# Patient Record
Sex: MALE | Race: WHITE | NOT HISPANIC OR LATINO | Employment: FULL TIME | URBAN - METROPOLITAN AREA
[De-identification: names, ages, dates, MRNs, and addresses within clinical notes are randomized per-mention and may not be internally consistent; named-entity substitution may affect disease eponyms.]

---

## 2022-09-16 DIAGNOSIS — M25.512 PAIN IN LEFT SHOULDER: Primary | ICD-10-CM

## 2022-10-24 ENCOUNTER — PROCEDURE VISIT (OUTPATIENT)
Dept: HEPATOLOGY | Facility: CLINIC | Age: 62
End: 2022-10-24
Payer: COMMERCIAL

## 2022-10-24 ENCOUNTER — OFFICE VISIT (OUTPATIENT)
Dept: HEPATOLOGY | Facility: CLINIC | Age: 62
End: 2022-10-24
Payer: COMMERCIAL

## 2022-10-24 VITALS
DIASTOLIC BLOOD PRESSURE: 74 MMHG | OXYGEN SATURATION: 94 % | TEMPERATURE: 99 F | HEART RATE: 70 BPM | BODY MASS INDEX: 33.32 KG/M2 | HEIGHT: 67 IN | RESPIRATION RATE: 18 BRPM | SYSTOLIC BLOOD PRESSURE: 139 MMHG | WEIGHT: 212.31 LBS

## 2022-10-24 DIAGNOSIS — K76.0 FATTY LIVER: Primary | ICD-10-CM

## 2022-10-24 DIAGNOSIS — E78.5 HYPERLIPIDEMIA, UNSPECIFIED HYPERLIPIDEMIA TYPE: ICD-10-CM

## 2022-10-24 DIAGNOSIS — E66.9 OBESITY (BMI 30.0-34.9): ICD-10-CM

## 2022-10-24 DIAGNOSIS — Z23 NEED FOR HEPATITIS A AND B VACCINATION: ICD-10-CM

## 2022-10-24 PROBLEM — A31.9 MYCOBACTERIUM GORDONAE INFECTION: Status: ACTIVE | Noted: 2018-08-29

## 2022-10-24 PROCEDURE — 99999 PR PBB SHADOW E&M-EST. PATIENT-LVL IV: CPT | Mod: PBBFAC,,, | Performed by: NURSE PRACTITIONER

## 2022-10-24 PROCEDURE — 3078F DIAST BP <80 MM HG: CPT | Mod: CPTII,S$GLB,, | Performed by: NURSE PRACTITIONER

## 2022-10-24 PROCEDURE — 1159F MED LIST DOCD IN RCRD: CPT | Mod: CPTII,S$GLB,, | Performed by: NURSE PRACTITIONER

## 2022-10-24 PROCEDURE — 3078F PR MOST RECENT DIASTOLIC BLOOD PRESSURE < 80 MM HG: ICD-10-PCS | Mod: CPTII,S$GLB,, | Performed by: NURSE PRACTITIONER

## 2022-10-24 PROCEDURE — 3075F PR MOST RECENT SYSTOLIC BLOOD PRESS GE 130-139MM HG: ICD-10-PCS | Mod: CPTII,S$GLB,, | Performed by: NURSE PRACTITIONER

## 2022-10-24 PROCEDURE — 1160F RVW MEDS BY RX/DR IN RCRD: CPT | Mod: CPTII,S$GLB,, | Performed by: NURSE PRACTITIONER

## 2022-10-24 PROCEDURE — 91200 LIVER ELASTOGRAPHY: CPT | Mod: S$GLB,,, | Performed by: NURSE PRACTITIONER

## 2022-10-24 PROCEDURE — 99204 PR OFFICE/OUTPT VISIT, NEW, LEVL IV, 45-59 MIN: ICD-10-PCS | Mod: S$GLB,,, | Performed by: NURSE PRACTITIONER

## 2022-10-24 PROCEDURE — 1159F PR MEDICATION LIST DOCUMENTED IN MEDICAL RECORD: ICD-10-PCS | Mod: CPTII,S$GLB,, | Performed by: NURSE PRACTITIONER

## 2022-10-24 PROCEDURE — 91200 FIBROSCAN NEW ORLEANS: ICD-10-PCS | Mod: S$GLB,,, | Performed by: NURSE PRACTITIONER

## 2022-10-24 PROCEDURE — 3075F SYST BP GE 130 - 139MM HG: CPT | Mod: CPTII,S$GLB,, | Performed by: NURSE PRACTITIONER

## 2022-10-24 PROCEDURE — 1160F PR REVIEW ALL MEDS BY PRESCRIBER/CLIN PHARMACIST DOCUMENTED: ICD-10-PCS | Mod: CPTII,S$GLB,, | Performed by: NURSE PRACTITIONER

## 2022-10-24 PROCEDURE — 99999 PR PBB SHADOW E&M-EST. PATIENT-LVL IV: ICD-10-PCS | Mod: PBBFAC,,, | Performed by: NURSE PRACTITIONER

## 2022-10-24 PROCEDURE — 99204 OFFICE O/P NEW MOD 45 MIN: CPT | Mod: S$GLB,,, | Performed by: NURSE PRACTITIONER

## 2022-10-24 RX ORDER — AMOXICILLIN 500 MG
CAPSULE ORAL DAILY
COMMUNITY

## 2022-10-24 RX ORDER — ROSUVASTATIN CALCIUM 10 MG/1
10 TABLET, COATED ORAL DAILY
COMMUNITY
Start: 2022-09-18

## 2022-10-24 RX ORDER — ESOMEPRAZOLE MAGNESIUM 40 MG/1
40 CAPSULE, DELAYED RELEASE ORAL DAILY
COMMUNITY
Start: 2022-09-18

## 2022-10-24 NOTE — PROGRESS NOTES
[facility] New Patient Visit    Reason for Visit: Fatty Liver    PCP: Tati Hutchins    HPI:  This is a 62 y.o. male with PMH noted below, here for evaluation of fatty liver.    The patient's risk factors for fatty liver disease include:     Obesity                                        Yes; BMI 33.25  Dyslipidemia                                Yes; On Rosuvastatin 10 mg daily.  Insulin resistance/Diabetes         No. Last HgbA1c was 5.3% (2019).  Family history of diabetes           No.     Abdominal ultrasound in 6/2022 showed a liver that demonstrated diffusely coarse and heterogenous parenchymal echotexture with a nodular contour; there was no ascites noted on imaging and spleen size was normal (10.7 cm). MAYO Fibrosure score was also elevated (0.50) on recent labs. LFT's have been historically normal. He has no known family history of liver disease. He has a history of heavy alcohol use (6 beers per day), but he significantly reduced his alcohol intake over the past 6-9 months.     He stopped drinking alcohol altogether in June. He does not use illicit drugs. Viral hepatitis testing was negative. He is not immune to Hepatitis A and B. Fibroscan today to stage his liver disease is suggestive of severe fatty infiltration (S3), with F0-F1 fibrosis, and a low likelihood of cirrhosis. He is well appearing, and has no signs or symptoms of hepatic decompensation including jaundice, dark urine, pruritus, abdominal distention, lower extremity edema, hematemesis, melena, or periods of confusion suggestive of hepatic encephalopathy. Further ROS below.     PMHX:  has a past medical history of Alcohol use, Fatty liver, GERD (gastroesophageal reflux disease), and HLD (hyperlipidemia).    PSHX:  has no past surgical history on file.    The patient's social and family histories were reviewed by me and updated in the appropriate section of the electronic medical record.    Review of patient's allergies  "indicates:  No Known Allergies    Current Outpatient Medications on File Prior to Visit   Medication Sig Dispense Refill    esomeprazole (NEXIUM) 40 MG capsule Take 40 mg by mouth once daily.      omega-3 fatty acids/fish oil (FISH OIL-OMEGA-3 FATTY ACIDS) 300-1,000 mg capsule Take by mouth once daily.      rosuvastatin (CRESTOR) 10 MG tablet Take 10 mg by mouth once daily.       No current facility-administered medications on file prior to visit.     SOCIAL HISTORY:   Social History     Tobacco Use   Smoking Status Not on file   Smokeless Tobacco Not on file     Social History     Substance and Sexual Activity   Alcohol Use None     Social History     Substance and Sexual Activity   Drug Use Not on file     ROS:   GENERAL: Denies fever, chills, weight loss/gain, fatigue  HEENT: Denies headaches, dizziness, vision/hearing changes  CARDIOVASCULAR: Denies chest pain, palpitations, or edema  RESPIRATORY: Denies dyspnea, cough  GI: Denies rectal bleeding, nausea, vomiting. No change in bowel pattern or color. + Intermittent RUQ pain.  : Denies dysuria, hematuria   SKIN: Denies rash, itching   NEURO: Denies confusion, memory loss, or mood changes  PSYCH: Denies depression or anxiety  HEME/LYMPH: Denies easy bruising or bleeding    Objective Findings:    PHYSICAL EXAM:   Friendly White male, in no acute distress; alert and oriented to person, place and time.  VITALS: /74 (BP Location: Right arm, Patient Position: Sitting, BP Method: Medium (Automatic))   Pulse 70   Temp 98.9 °F (37.2 °C) (Oral)   Resp 18   Ht 5' 7" (1.702 m)   Wt 96.3 kg (212 lb 4.9 oz)   SpO2 (!) 94%   BMI 33.25 kg/m²   HENT: Normocephalic, without obvious abnormality.   EYES: Sclerae anicteric.    NECK: No obvious masses.  CARDIOVASCULAR: Regular rate. No peripheral edema.  RESPIRATORY: Normal respiratory effort.   GI: Soft, obese abdomen.  EXTREMITIES:  No clubbing, cyanosis or edema.  SKIN: Warm and dry. No jaundice. No rashes noted to " exposed skin.   NEURO:  Normal gait. No asterixis.  PSYCH:  Memory intact. Thought and speech pattern appropriate. Behavior normal. No depression or anxiety noted.    DIAGNOSTIC STUDIES:    US ABDOMEN LIMITED 6/16/2022:     FINDINGS:       The study is limited by bowel gas.     The partially visualized pancreas is normal in appearance. The pancreatic duct is nondilated.     The liver demonstrates diffusely coarse and heterogenous parenchymal echotexture with a nodular contour. The right liver lobe is normal in size measuring 13.4 cm craniocaudally at the midclavicular line. No focal intrahepatic masses are noted. The portal vein is patent with hepatopedal flow with a velocity of 22.2 cm/s.       The gallbladder is normal. No gallstones are seen. The biliary system is normal in caliber. The common bile duct measures 3.4 mm in diameter.     The right kidney has a normal appearance and measures 10.4 x 4.3 x 5.1 cm (119) mL.     The spleen is at the upper limits of normal in size measuring 10.7 cm.     No ascites is noted.    Impression    1.   Morphologic changes of chronic hepatocellular disease including coarse and heterogenous parenchymal echotexture with a nodular contour. No focal hepatic mass identified.   2.   No sonographic evidence of cholecystitis.     FIBROSCAN 10/24/2022:    Findings  Median liver stiffness score:  4.8  CAP Reading: dB/m:  326     IQR/med %:  17  Interpretation  Fibrosis interpretation is based on medial liver stiffness - Kilopascal (kPa).     Fibrosis Stage:  F 0-1  Steatosis interpretation is based on controlled attenuation parameter - (dB/m).     Steatosis Grade:  S3    EDUCATION:  Per AVS.    ASSESSMENT & PLAN:  62 y.o. White male with:    1. Fatty liver  FibroScan [place] (Vibration Controlled Transient Elastography)    FibroScan Mitchell (Vibration Controlled Transient Elastography)      2. Obesity (BMI 30.0-34.9)  FibroScan [place] (Vibration Controlled Transient  Elastography)      3. Hyperlipidemia, unspecified hyperlipidemia type  FibroScan [place] (Vibration Controlled Transient Elastography)      4. Need for hepatitis A and B vaccination  hepatitis A and B vaccine, PF, (TWINRIX) 720 JAMEEL unit- 20 mcg/mL Syrg suspension        - Fibroscan today to stage liver disease.   - Recommend an Ultrasound of the liver every 2 years, next due 6/2024.  - Repeat liver function tests in 6-12 months (can be done through PCP).  - Recommend vaccination for Hepatitis A and B (RX sent to local pharmacy).  - Avoid alcohol and herbal supplements/alternative remedies.  - Recommend weight loss of 20 lbs, through diet and exercise.  - Recommend good control of cholesterol, blood pressure, & blood sugar levels.    Follow up in about 1 year (around 10/24/2023). with repeat Fibroscan before visit.    Thank you for allowing me to participate in the care of Tad Malik       Hepatology Nurse Practitioner  [facility]  10/24/2022 @ 1110    CC'ed note to:   Tati Hutchins MD Pheobe Askie, MD

## 2022-10-24 NOTE — PROCEDURES
FibroScan [place]    Date/Time: 10/24/2022 3:45 PM  Performed by: Jenifer Duval NP  Authorized by: Jenifer Duval NP     Diagnosis:  Other    Probe:  XL    Universal Protocol: Patient's identity, procedure and site were verified, confirmatory pause was performed.  Discussed procedure including risks and potential complications.  Questions answered.  Patient verbalizes understanding and wishes to proceed with VCTE.     Procedure: After providing explanations of the procedure, patient was placed in the supine position with right arm in maximum abduction to allow optimal exposure of right lateral abdomen.  Patient was briefly assessed, Testing was performed in the mid-axillary location, 50Hz Shear Wave pulses were applied and the resulting Shear Wave and Propagation Speed detected with a 3.5 MHz ultrasonic signal, using the FibroScan probe, Skin to liver capsule distance and liver parenchyma were accessed during the entire examination with the FibroScan probe, Patient was instructed to breathe normally and to abstain from sudden movements during the procedure, allowing for random measurements of liver stiffness. At least 10 Shear Waves were produced, Individual measurements of each Shear Wave were calculated.  Patient tolerated the procedure well with no complications.  Meets discharge criteria as was dismissed.  Rates pain 0 out of 10.  Patient will follow up with ordering provider to review results.    Findings  Median liver stiffness score:  4.8  CAP Reading: dB/m:  326    IQR/med %:  17  Interpretation  Fibrosis interpretation is based on medial liver stiffness - Kilopascal (kPa).    Fibrosis Stage:  F 0-1  Steatosis interpretation is based on controlled attenuation parameter - (dB/m).    Steatosis Grade:  S3

## 2022-10-24 NOTE — PATIENT INSTRUCTIONS
1. Fibroscan today to assess for fat and scar tissue in the liver  2. Recommend an Ultrasound of the liver every 2 years.  3. Repeat liver function tests in 6-12 months (can be done through PCP).  4. Recommend vaccination for Hepatitis A and B.  5. Avoid alcohol and herbal supplements/alternative remedies.  6. Return to clinic in 1 year.    There is no FDA approved therapy for non-alcoholic fatty liver disease. Therefore, these things are important:  1. Weight loss goal of 20 lbs.  2. Low carb/sugar, high fiber and protein diet.Try to limit your carb intake to LESS than 30-45 grams of carbs with a meal or LESS than 5-10 grams with any snack (total of any snack foods eaten during that time). Use MyFitness Pal pastor to add up your carbs through the day. Do NOT drink any beverages with calories or carbs (this can lead to high blood sugar and weight gain). Also, some of our patients have been very successful with weight loss using the pre made/planned meal planning services that limit calories and portion size (one example is Sensible Meals but there are many out there).  3. Exercise, 5 days per week, 30 minutes per day, as tolerated.  4. Recommend good control of cholesterol, blood pressure, & blood sugar levels.    In some people, fatty liver can progress to steatohepatitis (inflamatory fatty liver) and possibly to cirrhosis, putting one at increased risk for liver cancer, liver failure, and death. Therefore, the lifestyle changes are very important to decrease this risk.     Website with information about fatty liver and inflammation related to fatty liver (MAYO) = www.nashtruth.com  AND www.NASHactually.com

## 2022-10-25 ENCOUNTER — TELEPHONE (OUTPATIENT)
Dept: HEPATOLOGY | Facility: CLINIC | Age: 62
End: 2022-10-25
Payer: COMMERCIAL

## 2022-10-25 NOTE — TELEPHONE ENCOUNTER
----- Message from Jenifer Duval NP sent at 10/24/2022  4:25 PM CDT -----  Please set recall for RTC in 1 year with Fibroscan same day.

## 2022-11-17 ENCOUNTER — CLINICAL SUPPORT (OUTPATIENT)
Dept: REHABILITATION | Facility: OTHER | Age: 62
End: 2022-11-17
Payer: COMMERCIAL

## 2022-11-17 DIAGNOSIS — M62.81 MUSCLE WEAKNESS OF UPPER EXTREMITY: ICD-10-CM

## 2022-11-17 DIAGNOSIS — R29.3 POSTURAL IMBALANCE: ICD-10-CM

## 2022-11-17 DIAGNOSIS — G89.29 CHRONIC RIGHT SHOULDER PAIN: ICD-10-CM

## 2022-11-17 DIAGNOSIS — M25.511 CHRONIC RIGHT SHOULDER PAIN: ICD-10-CM

## 2022-11-17 PROCEDURE — 97161 PT EVAL LOW COMPLEX 20 MIN: CPT | Mod: PN

## 2022-11-17 PROCEDURE — 97110 THERAPEUTIC EXERCISES: CPT | Mod: PN

## 2022-11-17 NOTE — PATIENT INSTRUCTIONS
PRONE RETRACTION        Lying face down with your arms by your side, slowly squeeze your shoulder blades downward and towards your spine.     Hold for  3  Seconds. Perform  20  reps    Copyright © 2022 [employer]    PRONE W        Lying face down with your elbows bent and palms facing downward, slowly raise your arms up towards the ceiling as you squeeze your shoulder blades downward and towards your spine.     Hold for  3  Seconds. Perform  20  reps    Copyright © 2022 [employer]    Prone Ts        Start Position: arms out to your sides (like an airplane wing) Palms facing down.    End position: squeeze your shoulder blades together and raise your arms up     Hold for  3  Seconds. Perform 2 sets of  10  Reps.    Copyright © 2022 [employer]     SCAPULAR RETRACTIONS        Move your shoulder blades back and down. Hold, relax and repeat.     Hold for  3  Seconds. Perform   20  Reps.    Copyright © 2022 [employer]

## 2022-11-21 PROBLEM — M62.81 MUSCLE WEAKNESS OF UPPER EXTREMITY: Status: ACTIVE | Noted: 2022-11-21

## 2022-11-21 PROBLEM — R29.3 POSTURAL IMBALANCE: Status: ACTIVE | Noted: 2022-11-21

## 2022-11-21 PROBLEM — M25.511 RIGHT SHOULDER PAIN: Status: ACTIVE | Noted: 2022-11-21

## 2022-11-21 NOTE — PLAN OF CARE
[facility]  Physical Therapy Initial Evaluation    Name: Tad Gan  Clinic Number: [id number]    Therapy Diagnosis:   Encounter Diagnoses   Name Primary?    Chronic right shoulder pain     Postural imbalance     Muscle weakness of upper extremity      Physician: Tati Hutchins MD    Physician Orders: PT Eval and Treat   Medical Diagnosis: M25.512 (ICD-10-CM) - Pain in left shoulder  Evaluation Date: 11/17/2022  Authorization Period Expiration: 9/16/2023  Plan of Care Certification Period: 1/12/2023  Visit # / Visits authorized: 1/ 1    Time In: 10:45 am  Time Out: 11:25 am  Total Billable Time separate from evaluation: 9 minutes    Precautions: Standard      Subjective     History of current condition - Tad reports: that his R shoulder has bothered him all of his life. States it is not so much range of motion, but more of a positional thing. Sitting in a chair with arm rests can bother his R shoulder. Sleeping at night can be painful. Gets popping in his shoulder.       Past Medical History:   Diagnosis Date    Alcohol use     Fatty liver     GERD (gastroesophageal reflux disease)     HLD (hyperlipidemia)      Tad Gan  has no past surgical history on file.    Tad has a current medication list which includes the following prescription(s): esomeprazole, fish oil-omega-3 fatty acids, and rosuvastatin.    Review of patient's allergies indicates:  No Known Allergies     Imaging: no x-rays recently    Prior Therapy: none  Social History: single lives alone  Occupation: .  Prior Level of Function: I with amb and ADL  Current Level of Function: I with amb and ADL    Pain:  Current 0/10, worst 3/10, best 0/10   Location: right shoulder   Description: Dull and popping  Aggravating Factors: sitting in chair with arm rests, rolling his shoulder. Holding his coffee pot, pouring his coffee.  Easing Factors: change position or activity    Radicular symptoms: none    Sleep is  not disturbed. Sleeping position: side with arm under his head, but stopped years ago. Changes positions frequently.keeps R arm adducted and R elbow bent.     Pts goals: get shoulder feeling normal    Objective       Postural examination in standing:  - decreased  lumbar lordosis  - forward head  - forward shoulders    Postural examination in sitting:   - decreased lumbar lordosis  - forward head  - forward shoulders        Cervical AROM    flexion 45 deg   extension 40 deg   R rotation 50 deg   L rotation 60 deg   R side bending 35 deg   L side bending 35 deg       UE MMT R L   C3 Cervical side bend 5/5 5/5   C4 Shoulder Shrug 5/5  5/5   Shoulder flexion 5/5 5/5   Shoulder abduction 5/5 5/5   Shoulder IR 5/5 5/5   Shoulder ER 5/5 5/5   C5 Elbow flexion 5/5 * 5/5   C7 Elbow extension 5/5 5/5   C6 Wrist extension 5/5 5/5   Wrist flexion 5/5 5/5   Scapular protraction 5/5 5/5   Mid Traps 3+/5 3+/5   Lower traps 3-/5 3-/5     *- biceps cramping    UE Special Tests    Matthews-Levi negative   Neer Impingement negative   Yergason's negative   Empty Can negative       Endurance is good.    Joint Mobility                           R                          L  Shoulder flex                140 deg/                     150 deg  Shoulder abd                145 deg                   135 deg  Shoulder ER                  70 deg                    50 deg at side    Tender to palpation in B rhomboids and thoracic paraspinals      CMS Impairment/Limitation/Restriction for FOTO Shoulder Survey    Therapist reviewed FOTO scores for Tad Gan on 11/17/2022.   FOTO documents entered into [employer] - see Media section.    Limitation Score: 0%  Category: Carrying    Current : CH = 0 % impaired, limited or restricted  Goal: CI = at least 1% but < 20% impaired, limited or restricted         TREATMENT   Treatment Time In: 11:12 am  Treatment Time Out: 11:20 am  Total Treatment time separate from Evaluation time: 9 minutes    Tad  "received therapeutic exercises to develop strength, endurance, ROM, flexibility, and posture for 9 minutes including:  Prone scapular retractions 20 x 3"  Prone Ws 20 x 3"  Prone Ts 2 x 10 x 3"  Seated scap retractions 20 x 3"     Nv - resistance band periscapular strengthening. Bent over scap strengthening if prone is too painful for patient.      Home Exercises Provided and Patient Education Provided     Education provided:   - Discussed the role of the PTA on the Rehab Team. Discussed patient will be seen by a physical therapist minimally every 6th visit or every 30 days prior to being seen by PTA. Also discussed the use of the [facility] for communication.    Prone retractions  Prone Ws  Prone Ts  Scap retractions    Written Home Exercises Provided: yes.  Exercises were reviewed and Tad was able to demonstrate them prior to the end of the session.  Tad demonstrated good  understanding of the education provided.     See EMR under Patient Instructions for exercises provided 11/17/2022.    Assessment   Tad is a 62 y.o. male referred to outpatient Physical Therapy with a medical diagnosis of M25.512 (ICD-10-CM) - Pain in left shoulder. Pt presents with subjective reports of dysfunction in his R shoulder today. States his issues are less with range of motion, but more with popping and instability in his R shoulder. 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 Patient will benefit from OP PT for periscapular strengthening, postural reeducation, and UE strengthening to promote improved stability and function with R UE/shoulder. Initiated prone scapular strengthening exercises which patient reported difficulty performing.    Pt prognosis is Good.   Pt will benefit from skilled outpatient Physical Therapy to address the deficits stated above and in the chart below, provide pt/family education, and to maximize pt's level of independence.     Plan of care discussed with " patient: Yes  Pt's spiritual, cultural and educational needs considered and patient is agreeable to the plan of care and goals as stated below:     Anticipated Barriers for therapy: nwork schedule    Medical Necessity is demonstrated by the following  History  Co-morbidities and personal factors that may impact the plan of care Co-morbidities:   HTN and pulmonary nodules    Personal Factors:   no deficits     moderate   Examination  Body Structures and Functions, activity limitations and participation restrictions that may impact the plan of care Body Regions:   upper extremities    Body Systems:    ROM  strength    Participation Restrictions:   none    Activity limitations:   Learning and applying knowledge  no deficits    General Tasks and Commands  no deficits    Communication  no deficits    Mobility  lifting and carrying objects    Self care  no deficits    Domestic Life  no deficits    Interactions/Relationships  no deficits    Life Areas  no deficits    Community and Social Life  no deficits         moderate   Clinical Presentation stable and uncomplicated low   Decision Making/ Complexity Score: low     Goals:  Short Term Goals: 4 weeks   Independent with HEP.  Report decreased R shoulder pain < or = 2/10 with adls such as sitting in chair with arm rests, rolling his shoulder. Holding his coffee pot, pouring his coffee.  Increased MMT for B UE by 1/2 muscle grade to promote proper scapular stability to decrease R shoulder pain < or = 2/10 with adls such as sitting in chair with arm rests, rolling his shoulder. Holding his coffee pot, pouring his coffee.     Long Term Goals: 8 weeks   Report decreased R shoulder pain < or =  1/10 with adls such as sitting in chair with arm rests, rolling his shoulder. Holding his coffee pot, pouring his coffee.  Increased MMT for B UE by 1 muscle grade to promote proper scapular stability to decrease R shoulder pain < or = 1/10 with adls such as sitting in chair with arm  rests, rolling his shoulder. Holding his coffee pot, pouring his coffee.   Patient to achieve CI (at least 1% but less than 20% impaired, limited or restricted) level at 17% functional limitationon the FOTO Outcomes Measurement System.    Plan   Certification Period/Plan of care expiration: 11/17/2022 to 1/12/2023.    Outpatient Physical Therapy 2 times weekly for 8 weeks to include the following interventions: Manual Therapy, Moist Heat/ Ice, Neuromuscular Re-ed, Patient Education, Therapeutic Activities, and Therapeutic Exercise.     Jossue Gregg, PT

## 2022-11-22 ENCOUNTER — CLINICAL SUPPORT (OUTPATIENT)
Dept: REHABILITATION | Facility: OTHER | Age: 62
End: 2022-11-22
Payer: COMMERCIAL

## 2022-11-22 DIAGNOSIS — M25.511 CHRONIC RIGHT SHOULDER PAIN: Primary | ICD-10-CM

## 2022-11-22 DIAGNOSIS — R29.3 POSTURAL IMBALANCE: ICD-10-CM

## 2022-11-22 DIAGNOSIS — M62.81 MUSCLE WEAKNESS OF UPPER EXTREMITY: ICD-10-CM

## 2022-11-22 DIAGNOSIS — G89.29 CHRONIC RIGHT SHOULDER PAIN: Primary | ICD-10-CM

## 2022-11-22 PROCEDURE — 97110 THERAPEUTIC EXERCISES: CPT | Mod: PN,CQ

## 2022-11-22 NOTE — PROGRESS NOTES
"[facility]   Physical Therapy Treatment Note     Name: Tad Gan  Clinic Number: [id number]    Therapy Diagnosis:   Encounter Diagnoses   Name Primary?    Chronic right shoulder pain Yes    Postural imbalance     Muscle weakness of upper extremity      Physician: Tati Hutchins MD    Visit Date: 11/22/2022    Physician Orders: PT Eval and Treat   Medical Diagnosis: M25.512 (ICD-10-CM) - Pain in left shoulder  Evaluation Date: 11/17/2022  Authorization Period Expiration: 9/16/2023  Plan of Care Certification Period: 1/12/2023  Visit # / Visits authorized: 2/ 20  FOTO: 1/ 5 (11/17/2022)  PTA Visit #: 1/ 5    Precautions: Standard    Time In: 1:42 pm  Time Out: 2:27 pm  Total Billable Time: 45 minutes    SUBJECTIVE   Pt reports: "I feel fine" with no c/o pain.    He was compliant with home exercise program.  Response to previous treatment: "It felt good"  Functional change: None today    Pain: 0/10  Location: L shoulder    OBJECTIVE      11/17/2022:  Postural examination in standing:  - decreased  lumbar lordosis  - forward head  - forward shoulders     Postural examination in sitting:   - decreased lumbar lordosis  - forward head  - forward shoulders         Cervical AROM     flexion 45 deg   extension 40 deg   R rotation 50 deg   L rotation 60 deg   R side bending 35 deg   L side bending 35 deg         UE MMT R L   C3 Cervical side bend 5/5 5/5   C4 Shoulder Shrug 5/5  5/5   Shoulder flexion 5/5 5/5   Shoulder abduction 5/5 5/5   Shoulder IR 5/5 5/5   Shoulder ER 5/5 5/5   C5 Elbow flexion 5/5 * 5/5   C7 Elbow extension 5/5 5/5   C6 Wrist extension 5/5 5/5   Wrist flexion 5/5 5/5   Scapular protraction 5/5 5/5   Mid Traps 3+/5 3+/5   Lower traps 3-/5 3-/5      *- biceps cramping     Joint Mobility                           R                          L  Shoulder flex                140 deg/                     150 deg  Shoulder abd                145 deg                   135 " "deg  Shoulder ER                  70 deg                    50 deg at side     Tender to palpation in B rhomboids and thoracic paraspinals        CMS Impairment/Limitation/Restriction for FOTO Shoulder Survey     Therapist reviewed FOTO scores for Tad Gan on 11/17/2022.   FOTO documents entered into [facility] - see Media section.     Limitation Score: 0%  Category: Carrying     Current : CH = 0 % impaired, limited or restricted  Goal: CI = at least 1% but < 20% impaired, limited or restricted      TREATMENT     Tad received the bolded treatments listed below:      Patient received therapeutic exercises for 45 minutes for improved strength and AROM including:  Prone scapular retractions 20 x 3" (Bent over on PB)  Prone Ws 20 x 3" (Bent over on PB)  Prone Ts 2 x 10 x 3" (Bent over on PB)  +Prone Ys x 10 x 3" (Bent over on PB)  Seated scap retractions 20 x 3"  +Brueggers with RTB 2 x 10  +Rows with RTB 2 x 10  +Shoulder ext with RTB 2 x 10  +Wall circles with red ball 30 CW/CCW  +Wall slides x 15  3" holds    Patient received manual therapeutic technique for 00 minutes for improved soft tissue and joint mobility including:        Patient received neuromuscular reeducation for 00 minutes for improved proprioception and balance including:        Patient received therapeutic activities for 00 minutes for improved tolerance to functional activities including:        PATIENT EDUCATION AND HOME EXERCISES     Home Exercises Provided and Patient Education Provided     Education provided:   -Educated pt on importance of compliance with HEP  -Educated pt on postural awareness     Written Home Exercises Provided: Patient instructed to cont prior HEP. Exercises were reviewed and Tad was able to demonstrate them prior to the end of the session.  Tad demonstrated good  understanding of the education provided. See EMR under Patient Instructions for exercises provided during therapy sessions    ASSESSMENT   Pt tolerated " treatment well today with good muscular fatigue. Pt required some verbal cuing to correct exercise form with most exercises. Noted FFP with sitting, educated pt on postural awareness.    Tad Is progressing well towards his goals.   Pt prognosis is Good.     Pt will continue to benefit from skilled outpatient physical therapy to address the deficits listed in the problem list box on initial evaluation, provide pt/family education and to maximize pt's level of independence in the home and community environment.     Pt's spiritual, cultural and educational needs considered and pt agreeable to plan of care and goals.     Anticipated barriers to physical therapy: None    Goals:   Short Term Goals: 4 weeks   Independent with HEP.  Report decreased R shoulder pain < or = 2/10 with adls such as sitting in chair with arm rests, rolling his shoulder. Holding his coffee pot, pouring his coffee.  Increased MMT for B UE by 1/2 muscle grade to promote proper scapular stability to decrease R shoulder pain < or = 2/10 with adls such as sitting in chair with arm rests, rolling his shoulder. Holding his coffee pot, pouring his coffee.      Long Term Goals: 8 weeks   Report decreased R shoulder pain < or =  1/10 with adls such as sitting in chair with arm rests, rolling his shoulder. Holding his coffee pot, pouring his coffee.  Increased MMT for B UE by 1 muscle grade to promote proper scapular stability to decrease R shoulder pain < or = 1/10 with adls such as sitting in chair with arm rests, rolling his shoulder. Holding his coffee pot, pouring his coffee.   Patient to achieve CI (at least 1% but less than 20% impaired, limited or restricted) level at 17% functional limitationon the FOTO Outcomes Measurement System.    PLAN   Certification Period/Plan of care expiration: 11/17/2022 to 1/12/2023.    Continue to improve B UE strength and scapular stability      Outpatient Physical Therapy 2 times weekly for 8 weeks to include the  following interventions: Manual Therapy, Moist Heat/ Ice, Neuromuscular Re-ed, Patient Education, Therapeutic Activities, and Therapeutic Exercise.       Gene Ba III, PTA

## 2022-12-01 ENCOUNTER — CLINICAL SUPPORT (OUTPATIENT)
Dept: REHABILITATION | Facility: OTHER | Age: 62
End: 2022-12-01
Payer: COMMERCIAL

## 2022-12-01 DIAGNOSIS — R29.3 POSTURAL IMBALANCE: ICD-10-CM

## 2022-12-01 DIAGNOSIS — M62.81 MUSCLE WEAKNESS OF UPPER EXTREMITY: ICD-10-CM

## 2022-12-01 DIAGNOSIS — G89.29 CHRONIC RIGHT SHOULDER PAIN: Primary | ICD-10-CM

## 2022-12-01 DIAGNOSIS — M25.511 CHRONIC RIGHT SHOULDER PAIN: Primary | ICD-10-CM

## 2022-12-01 PROCEDURE — 97110 THERAPEUTIC EXERCISES: CPT | Mod: PN,CQ

## 2022-12-01 NOTE — PROGRESS NOTES
"[facility]   Physical Therapy Treatment Note     Name: Tad Gan  Clinic Number: [id number]    Therapy Diagnosis:   Encounter Diagnoses   Name Primary?    Chronic right shoulder pain Yes    Postural imbalance     Muscle weakness of upper extremity      Physician: Tati Hutchins MD    Visit Date: 12/1/2022    Physician Orders: PT Eval and Treat   Medical Diagnosis: M25.512 (ICD-10-CM) - Pain in left shoulder  Evaluation Date: 11/17/2022  Authorization Period Expiration: 9/16/2023  Plan of Care Certification Period: 1/12/2023  Visit # / Visits authorized: 3/ 20  FOTO: 2/ 5 (11/17/2022)  PTA Visit #: 2/ 5    Precautions: Standard    Time In: 1:42 pm  Time Out: 2:28 pm  Total Billable Time: 46 minutes    SUBJECTIVE   Pt reports: Had some soreness in the R shoulder    He was compliant with home exercise program.  Response to previous treatment: "It was a little sore between the shoulder blades"  Functional change: None today    Pain: 0/10  Location:     OBJECTIVE      11/17/2022:  Postural examination in standing:  - decreased  lumbar lordosis  - forward head  - forward shoulders     Postural examination in sitting:   - decreased lumbar lordosis  - forward head  - forward shoulders         Cervical AROM     flexion 45 deg   extension 40 deg   R rotation 50 deg   L rotation 60 deg   R side bending 35 deg   L side bending 35 deg         UE MMT R L   C3 Cervical side bend 5/5 5/5   C4 Shoulder Shrug 5/5  5/5   Shoulder flexion 5/5 5/5   Shoulder abduction 5/5 5/5   Shoulder IR 5/5 5/5   Shoulder ER 5/5 5/5   C5 Elbow flexion 5/5 * 5/5   C7 Elbow extension 5/5 5/5   C6 Wrist extension 5/5 5/5   Wrist flexion 5/5 5/5   Scapular protraction 5/5 5/5   Mid Traps 3+/5 3+/5   Lower traps 3-/5 3-/5      *- biceps cramping     Joint Mobility                           R                          L  Shoulder flex                140 deg/                     150 deg  Shoulder abd                145 deg       " "            135 deg  Shoulder ER                  70 deg                    50 deg at side     Tender to palpation in B rhomboids and thoracic paraspinals        CMS Impairment/Limitation/Restriction for FOTO Shoulder Survey     Therapist reviewed FOTO scores for Tad Gan on 11/17/2022.   FOTO documents entered into [employer] - see Media section.     Limitation Score: 0%  Category: Carrying     Current : CH = 0 % impaired, limited or restricted  Goal: CI = at least 1% but < 20% impaired, limited or restricted      TREATMENT     Tad received the bolded treatments listed below:      Patient received therapeutic exercises for 46 minutes for improved strength and AROM including:  Prone scapular retractions 20 x 3" (Bent over on PB)  Prone Ws 20 x 3" (Bent over on PB)  Prone Ts 2 x 10 x 3" (Bent over on PB)  Prone Ys x 10 x 3" (Bent over on PB)  Seated scap retractions 20 x 3"  Brueggers with RTB 2 x 10  Rows with RTB 2 x 10  Shoulder ext with RTB 2 x 10  Wall circles with red ball 30 CW/CCW  Wall slides x 15  3" holds  +Slouch correct 2 x 10  +Scapular retraction with row at shoulder height at 90 ER with OH arc  (might be too high level and need to revert to robberies)    Patient received manual therapeutic technique for 00 minutes for improved soft tissue and joint mobility including:        Patient received neuromuscular reeducation for 00 minutes for improved proprioception and balance including:        Patient received therapeutic activities for 00 minutes for improved tolerance to functional activities including:        PATIENT EDUCATION AND HOME EXERCISES     Home Exercises Provided and Patient Education Provided     Education provided:   -Educated pt on importance of compliance with HEP  -Educated pt on postural awareness     Written Home Exercises Provided: Patient instructed to cont prior HEP. Exercises were reviewed and Tad was able to demonstrate them prior to the end of the session.  Tad demonstrated " good  understanding of the education provided. See EMR under Patient Instructions for exercises provided during therapy sessions    ASSESSMENT   Pt continues with FFP with some improvement after several exercises. Pt required minor verbal/tactile cuing to correct form with exercises. Pt had difficulty performing new scapular exercise, may need to regress to lower leveled activity like robberies instead. Pt mentioned feeling soreness in deltoid and mid back, but not painful.    Tad Is progressing well towards his goals.   Pt prognosis is Good.     Pt will continue to benefit from skilled outpatient physical therapy to address the deficits listed in the problem list box on initial evaluation, provide pt/family education and to maximize pt's level of independence in the home and community environment.     Pt's spiritual, cultural and educational needs considered and pt agreeable to plan of care and goals.     Anticipated barriers to physical therapy: None    Goals:   Short Term Goals: 4 weeks   Independent with HEP.  Report decreased R shoulder pain < or = 2/10 with adls such as sitting in chair with arm rests, rolling his shoulder. Holding his coffee pot, pouring his coffee.  Increased MMT for B UE by 1/2 muscle grade to promote proper scapular stability to decrease R shoulder pain < or = 2/10 with adls such as sitting in chair with arm rests, rolling his shoulder. Holding his coffee pot, pouring his coffee.      Long Term Goals: 8 weeks   Report decreased R shoulder pain < or =  1/10 with adls such as sitting in chair with arm rests, rolling his shoulder. Holding his coffee pot, pouring his coffee.  Increased MMT for B UE by 1 muscle grade to promote proper scapular stability to decrease R shoulder pain < or = 1/10 with adls such as sitting in chair with arm rests, rolling his shoulder. Holding his coffee pot, pouring his coffee.   Patient to achieve CI (at least 1% but less than 20% impaired, limited or  restricted) level at 17% functional limitationon the FOTO Outcomes Measurement System.    PLAN   Certification Period/Plan of care expiration: 11/17/2022 to 1/12/2023.    Continue to improve B UE strength and scapular stability      Outpatient Physical Therapy 2 times weekly for 8 weeks to include the following interventions: Manual Therapy, Moist Heat/ Ice, Neuromuscular Re-ed, Patient Education, Therapeutic Activities, and Therapeutic Exercise.       Gene Ba III, PTA

## 2022-12-08 ENCOUNTER — CLINICAL SUPPORT (OUTPATIENT)
Dept: REHABILITATION | Facility: OTHER | Age: 62
End: 2022-12-08
Payer: COMMERCIAL

## 2022-12-08 DIAGNOSIS — R29.3 POSTURAL IMBALANCE: ICD-10-CM

## 2022-12-08 DIAGNOSIS — G89.29 CHRONIC RIGHT SHOULDER PAIN: Primary | ICD-10-CM

## 2022-12-08 DIAGNOSIS — M62.81 MUSCLE WEAKNESS OF UPPER EXTREMITY: ICD-10-CM

## 2022-12-08 DIAGNOSIS — M25.511 CHRONIC RIGHT SHOULDER PAIN: Primary | ICD-10-CM

## 2022-12-08 PROCEDURE — 97110 THERAPEUTIC EXERCISES: CPT | Mod: PN,CQ

## 2022-12-08 NOTE — PROGRESS NOTES
"[facility]   Physical Therapy Treatment Note     Name: Tad Gan  Clinic Number: [id number]    Therapy Diagnosis:   Encounter Diagnoses   Name Primary?    Chronic right shoulder pain Yes    Postural imbalance     Muscle weakness of upper extremity      Physician: Tati Hutchins MD    Visit Date: 12/8/2022    Physician Orders: PT Eval and Treat   Medical Diagnosis: M25.512 (ICD-10-CM) - Pain in left shoulder  Evaluation Date: 11/17/2022  Authorization Period Expiration: 9/16/2023  Plan of Care Certification Period: 1/12/2023  Visit # / Visits authorized: 4/ 20  FOTO: 3/ 5 (11/17/2022)  PTA Visit #: 3/ 5    Precautions: Standard    Time In: 1:37 pm  Time Out: 2:32 pm  Total Billable Time:  53 minutes    SUBJECTIVE   Pt reports: Experiencing pain when abducting R arm with elbow at 90 degrees    He was compliant with home exercise program.  Response to previous treatment: "It was fine"  Functional change: None today    Pain: 3-4/10  Location:     OBJECTIVE      11/17/2022:  Postural examination in standing:  - decreased  lumbar lordosis  - forward head  - forward shoulders     Postural examination in sitting:   - decreased lumbar lordosis  - forward head  - forward shoulders         Cervical AROM     flexion 45 deg   extension 40 deg   R rotation 50 deg   L rotation 60 deg   R side bending 35 deg   L side bending 35 deg         UE MMT R L   C3 Cervical side bend 5/5 5/5   C4 Shoulder Shrug 5/5  5/5   Shoulder flexion 5/5 5/5   Shoulder abduction 5/5 5/5   Shoulder IR 5/5 5/5   Shoulder ER 5/5 5/5   C5 Elbow flexion 5/5 * 5/5   C7 Elbow extension 5/5 5/5   C6 Wrist extension 5/5 5/5   Wrist flexion 5/5 5/5   Scapular protraction 5/5 5/5   Mid Traps 3+/5 3+/5   Lower traps 3-/5 3-/5      *- biceps cramping     Joint Mobility                           R                          L  Shoulder flex                140 deg/                     150 deg  Shoulder abd                145 deg             " "      135 deg  Shoulder ER                  70 deg                    50 deg at side     Tender to palpation in B rhomboids and thoracic paraspinals        CMS Impairment/Limitation/Restriction for FOTO Shoulder Survey     Therapist reviewed FOTO scores for Tad Gan on 11/17/2022.   FOTO documents entered into [employer] - see Media section.     Limitation Score: 0%  Category: Carrying     Current : CH = 0 % impaired, limited or restricted  Goal: CI = at least 1% but < 20% impaired, limited or restricted      TREATMENT     Tad received the bolded treatments listed below:      Patient received therapeutic exercises for 53 minutes for improved strength and AROM including:  Prone scapular retractions 20 x 3"   Prone Ws 2 x 10 x 3"   Prone Ts 2 x 10 x 3"   Prone Ys x 10 x 3"   Seated scap retractions 20 x 3"  Brueggers with RTB 2 x 10  Rows with RTB 2 x 10  Shoulder ext with RTB 2 x 10  Wall circles with red ball 30 CW/CCW  Wall slides x 15  3" holds  Slouch correct 2 x 10  Scapular retraction with row at shoulder height at 90 ER with OH arc  (might be too high level and need to revert to robberies)  +Robberies with RTB x 20    Patient received manual therapeutic technique for 00 minutes for improved soft tissue and joint mobility including:        Patient received neuromuscular reeducation for 00 minutes for improved proprioception and balance including:        Patient received therapeutic activities for 00 minutes for improved tolerance to functional activities including:        PATIENT EDUCATION AND HOME EXERCISES     Home Exercises Provided and Patient Education Provided     Education provided:   -Educated pt on importance of compliance with HEP  -Educated pt on postural awareness     Written Home Exercises Provided: Patient instructed to cont prior HEP. Exercises were reviewed and Tad was able to demonstrate them prior to the end of the session.  Tad demonstrated good  understanding of the education provided. " See EMR under Patient Instructions for exercises provided during therapy sessions    ASSESSMENT   Pt continues with FFP with some improvement after several exercises. Pt tolerated transition of exercises done on PB to being done in prone. Pt expressed feeling tightness and soreness in R tricep and shoulder with resistance. Pt required minimal VC's to correct form with prone exercises to engage scapula.    Tad Is progressing well towards his goals.   Pt prognosis is Good.     Pt will continue to benefit from skilled outpatient physical therapy to address the deficits listed in the problem list box on initial evaluation, provide pt/family education and to maximize pt's level of independence in the home and community environment.     Pt's spiritual, cultural and educational needs considered and pt agreeable to plan of care and goals.     Anticipated barriers to physical therapy: None    Goals:   Short Term Goals: 4 weeks   Independent with HEP.  Report decreased R shoulder pain < or = 2/10 with adls such as sitting in chair with arm rests, rolling his shoulder. Holding his coffee pot, pouring his coffee.  Increased MMT for B UE by 1/2 muscle grade to promote proper scapular stability to decrease R shoulder pain < or = 2/10 with adls such as sitting in chair with arm rests, rolling his shoulder. Holding his coffee pot, pouring his coffee.      Long Term Goals: 8 weeks   Report decreased R shoulder pain < or =  1/10 with adls such as sitting in chair with arm rests, rolling his shoulder. Holding his coffee pot, pouring his coffee.  Increased MMT for B UE by 1 muscle grade to promote proper scapular stability to decrease R shoulder pain < or = 1/10 with adls such as sitting in chair with arm rests, rolling his shoulder. Holding his coffee pot, pouring his coffee.   Patient to achieve CI (at least 1% but less than 20% impaired, limited or restricted) level at 17% functional limitationon the FOTO Outcomes Measurement  System.    PLAN   Certification Period/Plan of care expiration: 11/17/2022 to 1/12/2023.    Continue to improve B UE strength and scapular stability      Outpatient Physical Therapy 2 times weekly for 8 weeks to include the following interventions: Manual Therapy, Moist Heat/ Ice, Neuromuscular Re-ed, Patient Education, Therapeutic Activities, and Therapeutic Exercise.       Gene Ba III, PTA

## 2022-12-15 ENCOUNTER — CLINICAL SUPPORT (OUTPATIENT)
Dept: REHABILITATION | Facility: OTHER | Age: 62
End: 2022-12-15
Payer: COMMERCIAL

## 2022-12-15 DIAGNOSIS — M25.511 ACUTE PAIN OF RIGHT SHOULDER: Primary | ICD-10-CM

## 2022-12-15 DIAGNOSIS — R29.3 POSTURAL IMBALANCE: ICD-10-CM

## 2022-12-15 DIAGNOSIS — M62.81 MUSCLE WEAKNESS OF UPPER EXTREMITY: ICD-10-CM

## 2022-12-15 PROCEDURE — 97110 THERAPEUTIC EXERCISES: CPT | Mod: PN | Performed by: PHYSICAL THERAPIST

## 2022-12-15 NOTE — PROGRESS NOTES
[facility]   Physical Therapy Treatment Note     Name: Tad Gan  Clinic Number: [id number]    Therapy Diagnosis:   Encounter Diagnoses   Name Primary?    Acute pain of right shoulder Yes    Postural imbalance     Muscle weakness of upper extremity      Physician: Tati Hutchins MD    Visit Date: 12/15/2022    Physician Orders: PT Eval and Treat   Medical Diagnosis: M25.512 (ICD-10-CM) - Pain in left shoulder  Evaluation Date: 11/17/2022  Authorization Period Expiration: 9/16/2023  Plan of Care Certification Period: 1/12/2023  Visit # / Visits authorized: 4/ 20  FOTO: 3/ 5 (11/17/2022)  PTA Visit #: 3/ 5    Precautions: Standard    Time In: 1:45 pm  Time Out: 2:30 pm  Total Billable Time:  45 minutes    SUBJECTIVE   Pt reports: Experiencing pain when abducting R arm with elbow at 90 degrees. Sleeps on R side and more discomfort.     He was compliant with home exercise program.  Response to previous treatment: no adverse effects  Functional change: difficulty with abduction and ER at 90 deg abd    Pain: 3-4/10  Location:     OBJECTIVE      11/17/2022:     special test:  Anterior apprehension relocation test: positive  IRRST: IR weaker than ER     UE MMT R L   C3 Cervical side bend 5/5 5/5   C4 Shoulder Shrug 5/5  5/5   Shoulder flexion 5/5 5/5   Shoulder abduction 5/5 5/5   Shoulder IR 5/5 5/5   Shoulder ER 5/5 5/5   C5 Elbow flexion 5/5  5/5   C7 Elbow extension 5/5 5/5   C6 Wrist extension 5/5 5/5   Wrist flexion 5/5 5/5   Scapular protraction 5/5 5/5   Mid Traps 4-/5 4-/5   Lower traps 3+/5 3+/5      *- biceps cramping     Joint Mobility                           R                          L  Shoulder flex                151 deg/                     150 deg  Shoulder abd                150 deg                   135 deg  Shoulder ER                  70 deg                    50 deg at side        CMS Impairment/Limitation/Restriction for FOTO Shoulder Survey     Therapist reviewed FOTO  "scores for Tad Gan on 12/15/2022.   FOTO documents entered into [employer] - see Media section.     Limitation Score: 0%  Category: Carrying     Current : CH = 0 % impaired, limited or restricted  Goal: CI = at least 1% but < 20% impaired, limited or restricted      TREATMENT     Tad received the bolded treatments listed below:      Patient received therapeutic exercises for 45 minutes for improved strength and AROM including:    Reassessment activities  Prone scapular retractions 20 x 3"   Prone Ws 2 x 10 x 3"   Prone Ts 2 x 10 x 3"   Prone Ys x 10 x 3"   Supine serratus punches 4# 2 x 10  Seated scap retractions 20 x 3"  Brueggers with GTB 2 x 10  Rows with GTB 2 x 10  Shoulder ext with GTB 2 x 10  Lat pull down GTB 3 x 10  Wall circles with red ball 30 CW/CCW  Wall slides x 15  3" holds  Slouch correct 2 x 10  Scapular retraction with row at shoulder height at 90 ER with OH arc  (might be too high level and need to revert to robberies)  Robberies with RTB x 20  Serratus wall press x 20    Patient received manual therapeutic technique for 00 minutes for improved soft tissue and joint mobility including:        Patient received neuromuscular reeducation for 00 minutes for improved proprioception and balance including:        Patient received therapeutic activities for 00 minutes for improved tolerance to functional activities including:        PATIENT EDUCATION AND HOME EXERCISES     Home Exercises Provided and Patient Education Provided     Education provided:   -issued HEP with green TB    Written Home Exercises Provided: Patient instructed to cont prior HEP. Exercises were reviewed and Tad was able to demonstrate them prior to the end of the session.  Tad demonstrated good  understanding of the education provided. See EMR under Patient Instructions for exercises provided during therapy sessions    ASSESSMENT   Pt tolerated treatment well with month reassessment performed. Pt demonstrating improvements in R " shoulder abduction and flexion AROM. Continued weakness in periscapular musculature and painful end range ER at 90 deg abduction. Positive apprehension relocation testing for possible intraarticular pathology. Initiated some closed chain strengthening this visit.     Tad Is progressing well towards his goals.   Pt prognosis is Good.     Pt will continue to benefit from skilled outpatient physical therapy to address the deficits listed in the problem list box on initial evaluation, provide pt/family education and to maximize pt's level of independence in the home and community environment.     Pt's spiritual, cultural and educational needs considered and pt agreeable to plan of care and goals.     Anticipated barriers to physical therapy: None    Goals:   Short Term Goals: 4 weeks   Independent with HEP. (Met for initial HEP, ongoing)  Report decreased R shoulder pain < or = 2/10 with adls such as sitting in chair with arm rests, rolling his shoulder. Holding his coffee pot, pouring his coffee.  Increased MMT for B UE by 1/2 muscle grade to promote proper scapular stability to decrease R shoulder pain < or = 2/10 with adls such as sitting in chair with arm rests, rolling his shoulder. Holding his coffee pot, pouring his coffee.      Long Term Goals: 8 weeks   Report decreased R shoulder pain < or =  1/10 with adls such as sitting in chair with arm rests, rolling his shoulder. Holding his coffee pot, pouring his coffee.  Increased MMT for B UE by 1 muscle grade to promote proper scapular stability to decrease R shoulder pain < or = 1/10 with adls such as sitting in chair with arm rests, rolling his shoulder. Holding his coffee pot, pouring his coffee.   Patient to achieve CI (at least 1% but less than 20% impaired, limited or restricted) level at 17% functional limitationon the FOTO Outcomes Measurement System.    PLAN   Certification Period/Plan of care expiration: 11/17/2022 to 1/12/2023.    Continue to improve B  UE strength and scapular stability      Outpatient Physical Therapy 2 times weekly for 8 weeks to include the following interventions: Manual Therapy, Moist Heat/ Ice, Neuromuscular Re-ed, Patient Education, Therapeutic Activities, and Therapeutic Exercise.       Cass Aquino, PT

## 2023-01-05 ENCOUNTER — CLINICAL SUPPORT (OUTPATIENT)
Dept: REHABILITATION | Facility: OTHER | Age: 63
End: 2023-01-05
Payer: COMMERCIAL

## 2023-01-05 DIAGNOSIS — R29.3 POSTURAL IMBALANCE: ICD-10-CM

## 2023-01-05 DIAGNOSIS — G89.29 CHRONIC RIGHT SHOULDER PAIN: Primary | ICD-10-CM

## 2023-01-05 DIAGNOSIS — M62.81 MUSCLE WEAKNESS OF UPPER EXTREMITY: ICD-10-CM

## 2023-01-05 DIAGNOSIS — M25.511 CHRONIC RIGHT SHOULDER PAIN: Primary | ICD-10-CM

## 2023-01-05 PROCEDURE — 97110 THERAPEUTIC EXERCISES: CPT | Mod: PN,CQ

## 2023-01-12 ENCOUNTER — CLINICAL SUPPORT (OUTPATIENT)
Dept: REHABILITATION | Facility: OTHER | Age: 63
End: 2023-01-12
Payer: COMMERCIAL

## 2023-01-12 DIAGNOSIS — R29.3 POSTURAL IMBALANCE: ICD-10-CM

## 2023-01-12 DIAGNOSIS — M62.81 MUSCLE WEAKNESS OF UPPER EXTREMITY: ICD-10-CM

## 2023-01-12 DIAGNOSIS — M25.511 RIGHT SHOULDER PAIN, UNSPECIFIED CHRONICITY: Primary | ICD-10-CM

## 2023-01-12 PROCEDURE — 97110 THERAPEUTIC EXERCISES: CPT | Mod: PN

## 2023-01-12 NOTE — PLAN OF CARE
"[facility]  Physical Therapy Updated Plan of Care    Visit Date: 1/12/2023  Name: Tad Gan  Clinic Number: [id number]    Therapy Diagnosis:   Encounter Diagnoses   Name Primary?    Right shoulder pain, unspecified chronicity Yes    Postural imbalance     Muscle weakness of upper extremity      Physician: Tati Hutchins MD    Physician Orders: PT Eval and Treat   Medical Diagnosis: M25.512 (ICD-10-CM) - Pain in left shoulder  Evaluation Date: 11/17/2022    Total Visits Received: 7  Cancelled Visits: 1  No Show Visits: 0    Current Certification Period:  11/17/2022 to 1/12/2023  Precautions: Standard  Visits from Evaluation Date:  6  Functional Level Prior to Evaluation:  I with amb and ADL    Time In: 1:45 pm  Time Out: 2:25 pm  Total Billable Time: 40 minutes    Subjective     Update:     Pt reports: the pain I his R shoulder has improved since he started therapy. Did have some increased pain on Friday that went up to th right side of his neck, but prior to that he has not had as much pain. States he can get the pain in the R arm when he abducts the R arm. States the pain is similar to the pain he gets with vaccine shots. States he gets "soreness" in R shoulder at end range of bent arm side abd. Feels "things moving" in his R shoulder. States he is not having limitation with lifting or carrying. The pain is just "there" sometimes    He was somewhat compliant with home exercise program.  Response to previous treatment: good, no pain during the session  Functional change: less pain in his R shoulder    Pain: 0/10   Location: R shoulder    Objective     Update:     UE MMT R L   C3 Cervical side bend 5/5 5/5   C4 Shoulder Shrug 5/5  5/5   Shoulder flexion 5/5 5/5   Shoulder abduction 5/5 5/5   Shoulder IR 5/5 5/5   Shoulder ER 5/5 5/5   C5 Elbow flexion 5/5 * 5/5   C7 Elbow extension 5/5 5/5   C6 Wrist extension 5/5 5/5   Wrist flexion 5/5 5/5   Scapular protraction 5/5 5/5   Mid Traps " "4-/5 4-/5   Lower traps 3+/5 3+/5      *- biceps cramping    Pain with R UE abd at 60 to 90 deg.    Tender to palpation in R supraspinatus     Joint Mobility                                    R                           Shoulder flex                         156 deg/165 deg                   Shoulder abd                        165 deg/165 deg                    Shoulder ER at side                    75 deg    Shoulder ER at 90 deg abd        95 deg  Shoulder IR                                65 deg     CMS Impairment/Limitation/Restriction for FOTO Shoulder Survey     Therapist reviewed FOTO scores for Tad Gan on 1/12/2023.   FOTO documents entered into [employer] - see Media section.     Limitation Score: 25%  Category: Carrying     Current : CJ = at least 20% but < 40% impaired, limited or restricted  Goal: CI = at least 1% but < 20% impaired, limited or restricted       received the bolded treatments listed below:      Patient received therapeutic exercises for 40 minutes for improved strength and AROM including:  Reassessed strength, ROM, and functional limitation    Prone scapular retractions 20 x 3"   Prone flex 2 x 10 x 3"   Prone hor abd 2 x 10 x 3"   Prone ER x 10 reps       Assessment     Update:       Tad Is progressing well towards his goals.   Pt prognosis is Good.     Pt will continue to benefit from skilled outpatient physical therapy to address the deficits listed in the problem list box on initial evaluation, provide pt/family education and to maximize pt's level of independence in the home and community environment.     Pt's spiritual, cultural and educational needs considered and pt agreeable to plan of care and goals.     Anticipated barriers to physical therapy: None    Previous Goals Status:     Short Term Goals: 4 weeks   Independent with HEP. (ongoing)  Report decreased R shoulder pain < or = 2/10 with adls such as sitting in chair with arm rests, rolling his shoulder. Holding his coffee " pot, pouring his coffee. (Met)  Increased MMT for B UE by 1/2 muscle grade to promote proper scapular stability to decrease R shoulder pain < or = 2/10 with adls such as sitting in chair with arm rests, rolling his shoulder. Holding his coffee pot, pouring his coffee. (met)     Long Term Goals: 8 weeks   Report decreased R shoulder pain < or =  1/10 with adls such as sitting in chair with arm rests, rolling his shoulder. Holding his coffee pot, pouring his coffee.  Increased MMT for B UE by 1 muscle grade to promote proper scapular stability to decrease R shoulder pain < or = 1/10 with adls such as sitting in chair with arm rests, rolling his shoulder. Holding his coffee pot, pouring his coffee. (progressing, not met)  Patient to achieve CI (at least 1% but less than 20% impaired, limited or restricted) level at 17% functional limitationon the FOTO Outcomes Measurement System. (Not met)    Long Term Goal Status:   continue per initial plan of care.  Reasons for Recertification of Therapy:   Patient has progressed well in OP PT with increased R shoulder strength and ROM. Still presenting with poor posture with forward R shoulder contributing to tenderness and pain in R supraspinatus. Will benefit from OP PT for continued scapular stabilization and manual therapy to promote improve R shoulder positioning, increased scapular stabilization, and to decrease R shoulder pain. May be a candidate for dry needing.    Plan     Updated Certification Period: 1/12/2023 to 3/9/2023  Recommended Treatment Plan: 1 times per week for 8 weeks: Manual Therapy, Moist Heat/ Ice, Neuromuscular Re-ed, Patient Education, Therapeutic Activities, Therapeutic Exercise, and Dry Needling      Jossue Gregg, PT  1/12/2023      I CERTIFY THE NEED FOR THESE SERVICES FURNISHED UNDER THIS PLAN OF TREATMENT AND WHILE UNDER MY CARE    Physician's comments:        Physician's Signature:  ___________________________________________________

## 2023-01-18 NOTE — PROGRESS NOTES
"[facility]   Physical Therapy Treatment Note     Name: Tad Gan  Clinic Number: [id number]    Therapy Diagnosis:   Encounter Diagnoses   Name Primary?    Chronic right shoulder pain Yes    Postural imbalance     Muscle weakness of upper extremity        Physician: Tati Hutchins MD    Visit Date: 1/19/2023    Physician Orders: PT Eval and Treat   Medical Diagnosis: M25.512 (ICD-10-CM) - Pain in left shoulder  Evaluation Date: 11/17/2022  Authorization Period Expiration: 12/31/2023  Plan of Care Certification Period: 3/9/2023  Visit # / Visits authorized: 8 (3/20)  FOTO: 2/ 5 (1/12/2023)  PTA Visit #: 0/ 5    Precautions: Standard    Time In: 1:45 pm  Time Out: 2:30 pm  Total Billable Time:  41 minutes    SUBJECTIVE   Pt reports: he has had difficulty performing prone exercises secondary his bed is too soft    He was somewhat compliant with home exercise program.  Response to previous treatment: no adverse effects  Functional change: no change reported    Pain: 0/10   Location: R shoulder    OBJECTIVE         CMS Impairment/Limitation/Restriction for FOTO Shoulder Survey     Therapist reviewed FOTO scores for Tad Gan on 1/12/2023.   FOTO documents entered into [employer] - see Media section.     Limitation Score: 25%  Category: Carrying     Current : CJ = at least 20% but < 40% impaired, limited or restricted  Goal: CI = at least 1% but < 20% impaired, limited or restricted      TREATMENT     Tad received the bolded treatments listed below:      Patient received therapeutic exercises for 40 minutes for improved strength and AROM including:    UBE 2' forward/2' reverse after dry needling    Seated scapular retractions 20 x 3"     Bent over single UE Ws 2 x 10 x 3"   Bent over hor abd Ts 2 x 10 x 3"   Bent over single UE Ys x 10 x 3"   Bent over rows 2 x 10 reps    Supine serratus punches 4# 2 x 10  Seated scap retractions 20 x 3"  Brueggers with GTB 2 x 10  Rows with GTB 2 x " "10  Shoulder ext with GTB 2 x 10  Lat pull down GTB 3 x 10  Wall circles with red ball 30 CW/CCW  Wall slides x 15  3" holds  Slouch correct 2 x 10  Scapular retraction with row at shoulder height at 90 ER with OH arc  (might be too high level and need to revert to robberies)  Robberies with RTB x 20  Serratus wall press x 20  Push up plus on wall 2 x 10  Iso inferior glide 2 x 10  Seated thoracic ext over half foam x 10    Patient received manual therapeutic technique for 5 minutes for improved soft tissue and joint mobility including:    Application of TDN: Pt educated on benefits and potential side effects of dry needling. Educated pt on benefits, precautions, side effects following TDN. Educated pt to use heat following treatment sessions if pt is experiencing pain or soreness. Pt verbalized good understanding of education.  Pt signed written consent to dry needling. Pt gave verbal consent for DN    Pt received dry needling to the below listed muscles using 60 mm needles.  R subacromial bursa    Winding performed every 5 minutes during treatment.     Patient received neuromuscular reeducation for 00 minutes for improved proprioception and balance including:        Patient received therapeutic activities for 00 minutes for improved tolerance to functional activities including:        PATIENT EDUCATION AND HOME EXERCISES     Home Exercises Provided and Patient Education Provided     Education provided:   - discussed performing prone exercises in bent over position secondary not having a good surface at his home. Discussed the use of heat tonight if he experiences needle site soreness    Written Home Exercises Provided: Patient instructed to cont prior HEP. Exercises were reviewed and Tad was able to demonstrate them prior to the end of the session.  Tad demonstrated good  understanding of the education provided. See EMR under Patient Instructions for exercises provided during therapy sessions    ASSESSMENT "   Initiated dry needling with single needle site today to decrease pain with abduction. Adjusted exercise positioning for prone exercises at home secondary bed too soft.    Tad Is progressing well towards his goals.   Pt prognosis is Good.     Pt will continue to benefit from skilled outpatient physical therapy to address the deficits listed in the problem list box on initial evaluation, provide pt/family education and to maximize pt's level of independence in the home and community environment.     Pt's spiritual, cultural and educational needs considered and pt agreeable to plan of care and goals.     Anticipated barriers to physical therapy: None    Goals:   Short Term Goals: 4 weeks   Independent with HEP. (ongoing)  Report decreased R shoulder pain < or = 2/10 with adls such as sitting in chair with arm rests, rolling his shoulder. Holding his coffee pot, pouring his coffee. (Met)  Increased MMT for B UE by 1/2 muscle grade to promote proper scapular stability to decrease R shoulder pain < or = 2/10 with adls such as sitting in chair with arm rests, rolling his shoulder. Holding his coffee pot, pouring his coffee. (met)     Long Term Goals: 8 weeks   Report decreased R shoulder pain < or =  1/10 with adls such as sitting in chair with arm rests, rolling his shoulder. Holding his coffee pot, pouring his coffee.  Increased MMT for B UE by 1 muscle grade to promote proper scapular stability to decrease R shoulder pain < or = 1/10 with adls such as sitting in chair with arm rests, rolling his shoulder. Holding his coffee pot, pouring his coffee. (progressing, not met)  Patient to achieve CI (at least 1% but less than 20% impaired, limited or restricted) level at 17% functional limitationon the FOTO Outcomes Measurement System. (Not met)    PLAN   Certification Period/Plan of care expiration: 1/12/2023 to 3/9/2023    Continue to improve B UE strength and scapular stability      Outpatient Physical Therapy 2 times  weekly for 8 weeks to include the following interventions: Manual Therapy, Moist Heat/ Ice, Neuromuscular Re-ed, Patient Education, Therapeutic Activities, and Therapeutic Exercise.       Jossue Gregg, PT

## 2023-01-19 ENCOUNTER — CLINICAL SUPPORT (OUTPATIENT)
Dept: REHABILITATION | Facility: OTHER | Age: 63
End: 2023-01-19
Payer: COMMERCIAL

## 2023-01-19 DIAGNOSIS — R29.3 POSTURAL IMBALANCE: ICD-10-CM

## 2023-01-19 DIAGNOSIS — G89.29 CHRONIC RIGHT SHOULDER PAIN: Primary | ICD-10-CM

## 2023-01-19 DIAGNOSIS — M62.81 MUSCLE WEAKNESS OF UPPER EXTREMITY: ICD-10-CM

## 2023-01-19 DIAGNOSIS — M25.511 CHRONIC RIGHT SHOULDER PAIN: Primary | ICD-10-CM

## 2023-01-19 PROCEDURE — 97110 THERAPEUTIC EXERCISES: CPT | Mod: PN

## 2023-02-14 ENCOUNTER — OFFICE VISIT (OUTPATIENT)
Dept: GASTROENTEROLOGY | Facility: CLINIC | Age: 63
End: 2023-02-14
Payer: COMMERCIAL

## 2023-02-14 ENCOUNTER — LAB VISIT (OUTPATIENT)
Dept: LAB | Facility: HOSPITAL | Age: 63
End: 2023-02-14
Attending: INTERNAL MEDICINE
Payer: COMMERCIAL

## 2023-02-14 VITALS
HEART RATE: 69 BPM | SYSTOLIC BLOOD PRESSURE: 138 MMHG | HEIGHT: 68 IN | WEIGHT: 213.38 LBS | BODY MASS INDEX: 32.34 KG/M2 | DIASTOLIC BLOOD PRESSURE: 81 MMHG

## 2023-02-14 DIAGNOSIS — K21.9 GASTROESOPHAGEAL REFLUX DISEASE, UNSPECIFIED WHETHER ESOPHAGITIS PRESENT: ICD-10-CM

## 2023-02-14 DIAGNOSIS — Z86.010 HISTORY OF COLON POLYPS: ICD-10-CM

## 2023-02-14 DIAGNOSIS — R10.9 RIGHT FLANK PAIN: ICD-10-CM

## 2023-02-14 DIAGNOSIS — R58 BLOOD ON TOILET PAPER: ICD-10-CM

## 2023-02-14 DIAGNOSIS — K21.9 GASTROESOPHAGEAL REFLUX DISEASE, UNSPECIFIED WHETHER ESOPHAGITIS PRESENT: Primary | ICD-10-CM

## 2023-02-14 LAB
ALBUMIN SERPL BCP-MCNC: 3.8 G/DL (ref 3.5–5.2)
ALP SERPL-CCNC: 66 U/L (ref 55–135)
ALT SERPL W/O P-5'-P-CCNC: 22 U/L (ref 10–44)
ANION GAP SERPL CALC-SCNC: 9 MMOL/L (ref 8–16)
AST SERPL-CCNC: 22 U/L (ref 10–40)
BASOPHILS # BLD AUTO: 0.04 K/UL (ref 0–0.2)
BASOPHILS NFR BLD: 0.4 % (ref 0–1.9)
BILIRUB SERPL-MCNC: 0.8 MG/DL (ref 0.1–1)
BUN SERPL-MCNC: 15 MG/DL (ref 8–23)
CALCIUM SERPL-MCNC: 8.9 MG/DL (ref 8.7–10.5)
CHLORIDE SERPL-SCNC: 106 MMOL/L (ref 95–110)
CO2 SERPL-SCNC: 24 MMOL/L (ref 23–29)
CREAT SERPL-MCNC: 1 MG/DL (ref 0.5–1.4)
DIFFERENTIAL METHOD: ABNORMAL
EOSINOPHIL # BLD AUTO: 0.1 K/UL (ref 0–0.5)
EOSINOPHIL NFR BLD: 0.9 % (ref 0–8)
ERYTHROCYTE [DISTWIDTH] IN BLOOD BY AUTOMATED COUNT: 12.7 % (ref 11.5–14.5)
EST. GFR  (NO RACE VARIABLE): >60 ML/MIN/1.73 M^2
GLUCOSE SERPL-MCNC: 97 MG/DL (ref 70–110)
HCT VFR BLD AUTO: 42.4 % (ref 40–54)
HGB BLD-MCNC: 14 G/DL (ref 14–18)
IMM GRANULOCYTES # BLD AUTO: 0.05 K/UL (ref 0–0.04)
IMM GRANULOCYTES NFR BLD AUTO: 0.6 % (ref 0–0.5)
LYMPHOCYTES # BLD AUTO: 2.4 K/UL (ref 1–4.8)
LYMPHOCYTES NFR BLD: 26.7 % (ref 18–48)
MCH RBC QN AUTO: 30.6 PG (ref 27–31)
MCHC RBC AUTO-ENTMCNC: 33 G/DL (ref 32–36)
MCV RBC AUTO: 93 FL (ref 82–98)
MONOCYTES # BLD AUTO: 0.6 K/UL (ref 0.3–1)
MONOCYTES NFR BLD: 6.1 % (ref 4–15)
NEUTROPHILS # BLD AUTO: 5.9 K/UL (ref 1.8–7.7)
NEUTROPHILS NFR BLD: 65.3 % (ref 38–73)
NRBC BLD-RTO: 0 /100 WBC
PLATELET # BLD AUTO: 257 K/UL (ref 150–450)
PMV BLD AUTO: 8.9 FL (ref 9.2–12.9)
POTASSIUM SERPL-SCNC: 4.1 MMOL/L (ref 3.5–5.1)
PROT SERPL-MCNC: 6.6 G/DL (ref 6–8.4)
RBC # BLD AUTO: 4.57 M/UL (ref 4.6–6.2)
SODIUM SERPL-SCNC: 139 MMOL/L (ref 136–145)
WBC # BLD AUTO: 9.07 K/UL (ref 3.9–12.7)

## 2023-02-14 PROCEDURE — 99203 PR OFFICE/OUTPT VISIT, NEW, LEVL III, 30-44 MIN: ICD-10-PCS | Mod: S$GLB,,, | Performed by: INTERNAL MEDICINE

## 2023-02-14 PROCEDURE — 3008F PR BODY MASS INDEX (BMI) DOCUMENTED: ICD-10-PCS | Mod: CPTII,S$GLB,, | Performed by: INTERNAL MEDICINE

## 2023-02-14 PROCEDURE — 1160F RVW MEDS BY RX/DR IN RCRD: CPT | Mod: CPTII,S$GLB,, | Performed by: INTERNAL MEDICINE

## 2023-02-14 PROCEDURE — 99999 PR PBB SHADOW E&M-EST. PATIENT-LVL III: ICD-10-PCS | Mod: PBBFAC,,, | Performed by: INTERNAL MEDICINE

## 2023-02-14 PROCEDURE — 99203 OFFICE O/P NEW LOW 30 MIN: CPT | Mod: S$GLB,,, | Performed by: INTERNAL MEDICINE

## 2023-02-14 PROCEDURE — 3079F DIAST BP 80-89 MM HG: CPT | Mod: CPTII,S$GLB,, | Performed by: INTERNAL MEDICINE

## 2023-02-14 PROCEDURE — 3075F SYST BP GE 130 - 139MM HG: CPT | Mod: CPTII,S$GLB,, | Performed by: INTERNAL MEDICINE

## 2023-02-14 PROCEDURE — 80053 COMPREHEN METABOLIC PANEL: CPT | Performed by: INTERNAL MEDICINE

## 2023-02-14 PROCEDURE — 3075F PR MOST RECENT SYSTOLIC BLOOD PRESS GE 130-139MM HG: ICD-10-PCS | Mod: CPTII,S$GLB,, | Performed by: INTERNAL MEDICINE

## 2023-02-14 PROCEDURE — 99999 PR PBB SHADOW E&M-EST. PATIENT-LVL III: CPT | Mod: PBBFAC,,, | Performed by: INTERNAL MEDICINE

## 2023-02-14 PROCEDURE — 1159F PR MEDICATION LIST DOCUMENTED IN MEDICAL RECORD: ICD-10-PCS | Mod: CPTII,S$GLB,, | Performed by: INTERNAL MEDICINE

## 2023-02-14 PROCEDURE — 1160F PR REVIEW ALL MEDS BY PRESCRIBER/CLIN PHARMACIST DOCUMENTED: ICD-10-PCS | Mod: CPTII,S$GLB,, | Performed by: INTERNAL MEDICINE

## 2023-02-14 PROCEDURE — 85025 COMPLETE CBC W/AUTO DIFF WBC: CPT | Performed by: INTERNAL MEDICINE

## 2023-02-14 PROCEDURE — 3008F BODY MASS INDEX DOCD: CPT | Mod: CPTII,S$GLB,, | Performed by: INTERNAL MEDICINE

## 2023-02-14 PROCEDURE — 36415 COLL VENOUS BLD VENIPUNCTURE: CPT | Performed by: INTERNAL MEDICINE

## 2023-02-14 PROCEDURE — 3079F PR MOST RECENT DIASTOLIC BLOOD PRESSURE 80-89 MM HG: ICD-10-PCS | Mod: CPTII,S$GLB,, | Performed by: INTERNAL MEDICINE

## 2023-02-14 PROCEDURE — 1159F MED LIST DOCD IN RCRD: CPT | Mod: CPTII,S$GLB,, | Performed by: INTERNAL MEDICINE

## 2023-02-14 NOTE — PROGRESS NOTES
[facility] Consultation Note    Reason for Consult:    Chief Complaint   Patient presents with    Establish Care     Right side mild pain under rib cage        PCP:   Tati Hutchins    Referring MD:  Tati Hutchins Md  [address] [place] [address]      HPI:  Tad Gan is a 62 y.o. male who was referred by his primary care for recent episode of blood noted in stool, and is here to establish care.  Previously followed by Dr. Martinez with [facility], who has retired.  He is new to our clinic.  Records reviewed.  He was previously referred to our hepatology group for fatty liver disease, who he saw in October of last year.  He has history of GERD and fatty liver disease.  He takes Nexium daily for this.  He denies significant reflux symptoms.  He has normal appetite and eats regular meals.  He denies bloating.  He has intermittent tenderness in the right flank, underneath the ribs it has been present since at least 2018.  This has not worsened.  In fact, he feels it may be slightly better since cutting back on alcohol intake.  He stopped drinking beer over a year ago, and stopped drinking wine about 6 months ago.  Recently he saw a single episode of red blood on the toilet paper when wiping after a bowel movement.  He denies having constipation or diarrhea.  He denies recent changes in bowel habits.  This happened about 1-2 months ago and did not happen again.  He denies abdominal pain or rectal pain.  He had a colonoscopy in April 2021 for which I do not have the report.  Tells me that polyps were found at that time.  He also had a colonoscopy 3 years before that during which polyps were found.  He reports having had upper endoscopy procedures with each of the colonoscopies.  He is not aware of any significant problems or abnormalities.          ROS:  Constitutional: No fevers, chills, No weight loss, normal appetite  ENT: No congestion,  "rhinorrhea, or chronic sinus problems  CV: No chest pain or palpitations  Pulm: No cough, No shortness of breath  Ophtho: No vision changes or pain  GI: see HPI  Derm: No rash or lesions      Medical History:  has a past medical history of Alcohol use, Fatty liver, GERD (gastroesophageal reflux disease), and HLD (hyperlipidemia).    Surgical History:  has no past surgical history on file.    Family History: family history is not on file.    Social History:  reports that he has quit smoking. His smoking use included cigarettes. He has never used smokeless tobacco. He reports that he does not currently use alcohol.  Drug: Morphine.    Review of patient's allergies indicates:  No Known Allergies    Prior to Admission medications    Medication Sig Start Date End Date Taking? Authorizing Provider   esomeprazole (NEXIUM) 40 MG capsule Take 40 mg by mouth once daily. 9/18/22  Yes Historical Provider   omega-3 fatty acids/fish oil (FISH OIL-OMEGA-3 FATTY ACIDS) 300-1,000 mg capsule Take by mouth once daily.   Yes Historical Provider   rosuvastatin (CRESTOR) 10 MG tablet Take 10 mg by mouth once daily. 9/18/22  Yes Historical Provider       Objective Findings:  Vital Signs:  /81 (BP Location: Left arm, Patient Position: Sitting, BP Method: Medium (Automatic))   Pulse 69   Ht 5' 7.5" (1.715 m)   Wt 96.8 kg (213 lb 6.5 oz)   BMI 32.93 kg/m²   Body mass index is 32.93 kg/m².      Physical Exam:  General Appearance:  Well appearing in no acute distress, appears stated age  Head:  Normocephalic, atraumatic  Eyes:  No scleral icterus or pallor, EOMI  Neurologic:  AAO x 4; CN II-XII intact        Labs:  Outside labs reviewed.  Patient had a normal CMP in April last year.                Imaging:  Ultrasound of the abdomen 06/16/2022:   FINDINGS:    The study is limited by bowel gas.  The partially visualized pancreas is normal in appearance. The pancreatic duct is nondilated.   The liver demonstrates diffusely coarse and " heterogenous parenchymal echotexture with a nodular contour. The right liver lobe is normal in size measuring 13.4 cm craniocaudally at the midclavicular line. No focal intrahepatic masses are noted. The portal vein is patent with hepatopedal flow with a velocity of 22.2 cm/s.     The gallbladder is normal. No gallstones are seen. The biliary system is normal in caliber. The common bile duct measures 3.4 mm in diameter.   The right kidney has a normal appearance and measures 10.4 x 4.3 x 5.1 cm (119) mL.   The spleen is at the upper limits of normal in size measuring 10.7 cm.   No ascites is noted.     IMPRESSION:   1.   Morphologic changes of chronic hepatocellular disease including coarse and heterogenous parenchymal echotexture with a nodular contour. No focal hepatic mass identified.   2.   No sonographic evidence of cholecystitis.                     Assessment:  Tad Gan is a 62 y.o. male with:  1. Gastroesophageal reflux disease, unspecified whether esophagitis present    2. History of colon polyps    3. Right flank pain    4. Blood on toilet paper      Single episode of red blood seen on toilet paper with wiping.  No significant GI symptoms or complaints otherwise.  He has a history of colon polyps with last colonoscopy May 2021.  He also had a colonoscopy 3 years prior to that with polyps removed.  He was told he needs another colonoscopy in 2024.    History of right flank pain that is intermittent in nature.  This has been present for several years, not worsening, and in fact has been improving.  Unlikely GI related.    History of GERD that is well controlled with daily Nexium.  No red flag symptoms.  He reports having had an upper endoscopy in May 2021 for which I do not have the results.        Recommendations/Plan:  1. I will check CBC and a CMP today  2. Records requested from Dr. Martinez's office  3. He will contact our office if he sees blood in the stool again.  No further intervention necessary  at this time.      Follow-up in 1 year at which time we can discuss arranging colonoscopy for surveillance purposes.      Order summary:  Orders Placed This Encounter    CBC Auto Differential    Comprehensive Metabolic Panel         Thank you so much for allowing me to participate in the care of Tad Varma MD

## 2023-08-29 ENCOUNTER — HOSPITAL ENCOUNTER (OUTPATIENT)
Dept: RADIOLOGY | Facility: HOSPITAL | Age: 63
Discharge: HOME | End: 2023-08-29
Attending: FAMILY MEDICINE
Payer: COMMERCIAL

## 2023-08-29 DIAGNOSIS — R91.8 OTHER NONSPECIFIC ABNORMAL FINDING OF LUNG FIELD: ICD-10-CM

## 2023-08-29 DIAGNOSIS — R91.8 MULTIPLE PULMONARY NODULES: ICD-10-CM

## 2023-08-29 PROCEDURE — 71250 CT THORAX DX C-: CPT | Mod: TC

## 2023-08-29 PROCEDURE — 71250 CT CHEST WITHOUT CONTRAST: ICD-10-PCS | Mod: 26,,, | Performed by: RADIOLOGY

## 2023-08-29 PROCEDURE — 71250 CT THORAX DX C-: CPT | Mod: 26,,, | Performed by: RADIOLOGY
